# Patient Record
Sex: MALE | Race: OTHER | HISPANIC OR LATINO
[De-identification: names, ages, dates, MRNs, and addresses within clinical notes are randomized per-mention and may not be internally consistent; named-entity substitution may affect disease eponyms.]

---

## 2017-01-17 ENCOUNTER — APPOINTMENT (OUTPATIENT)
Dept: INTERNAL MEDICINE | Facility: CLINIC | Age: 32
End: 2017-01-17

## 2017-01-17 VITALS
HEIGHT: 64 IN | BODY MASS INDEX: 30.73 KG/M2 | WEIGHT: 180 LBS | SYSTOLIC BLOOD PRESSURE: 123 MMHG | HEART RATE: 70 BPM | DIASTOLIC BLOOD PRESSURE: 76 MMHG

## 2017-01-17 DIAGNOSIS — F17.200 NICOTINE DEPENDENCE, UNSPECIFIED, UNCOMPLICATED: ICD-10-CM

## 2017-01-17 DIAGNOSIS — E73.9 LACTOSE INTOLERANCE, UNSPECIFIED: ICD-10-CM

## 2017-04-18 ENCOUNTER — APPOINTMENT (OUTPATIENT)
Dept: INTERNAL MEDICINE | Facility: CLINIC | Age: 32
End: 2017-04-18

## 2017-04-18 VITALS
DIASTOLIC BLOOD PRESSURE: 70 MMHG | BODY MASS INDEX: 29.71 KG/M2 | HEIGHT: 64 IN | WEIGHT: 174 LBS | SYSTOLIC BLOOD PRESSURE: 115 MMHG | HEART RATE: 70 BPM

## 2017-04-18 DIAGNOSIS — Z31.69 ENCOUNTER FOR OTHER GENERAL COUNSELING AND ADVICE ON PROCREATION: ICD-10-CM

## 2017-04-18 DIAGNOSIS — Z87.898 PERSONAL HISTORY OF OTHER SPECIFIED CONDITIONS: ICD-10-CM

## 2017-04-18 LAB
ESTIMATED AVERGAGE GLUCOSE (NORTH): 108 MG/DL
HBA1C MFR BLD: 5.4 %

## 2017-04-20 ENCOUNTER — EMERGENCY (EMERGENCY)
Facility: HOSPITAL | Age: 32
LOS: 0 days | Discharge: HOME | End: 2017-04-20
Admitting: INTERNAL MEDICINE

## 2017-06-25 ENCOUNTER — EMERGENCY (EMERGENCY)
Facility: HOSPITAL | Age: 32
LOS: 1 days | Discharge: HOME | End: 2017-06-25

## 2017-06-25 DIAGNOSIS — T16.2XXA FOREIGN BODY IN LEFT EAR, INITIAL ENCOUNTER: ICD-10-CM

## 2017-06-27 DIAGNOSIS — S61.421D: ICD-10-CM
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
## 2017-07-18 ENCOUNTER — APPOINTMENT (OUTPATIENT)
Dept: INTERNAL MEDICINE | Facility: CLINIC | Age: 32
End: 2017-07-18

## 2018-10-24 ENCOUNTER — OUTPATIENT (OUTPATIENT)
Dept: OUTPATIENT SERVICES | Facility: HOSPITAL | Age: 33
LOS: 1 days | Discharge: HOME | End: 2018-10-24

## 2018-10-24 DIAGNOSIS — Z01.21 ENCOUNTER FOR DENTAL EXAMINATION AND CLEANING WITH ABNORMAL FINDINGS: ICD-10-CM

## 2018-11-03 ENCOUNTER — EMERGENCY (EMERGENCY)
Facility: HOSPITAL | Age: 33
LOS: 0 days | Discharge: HOME | End: 2018-11-03
Admitting: PHYSICIAN ASSISTANT

## 2018-11-03 VITALS
DIASTOLIC BLOOD PRESSURE: 77 MMHG | SYSTOLIC BLOOD PRESSURE: 138 MMHG | TEMPERATURE: 98 F | HEART RATE: 76 BPM | RESPIRATION RATE: 20 BRPM | OXYGEN SATURATION: 100 %

## 2018-11-03 DIAGNOSIS — H10.9 UNSPECIFIED CONJUNCTIVITIS: ICD-10-CM

## 2018-11-03 DIAGNOSIS — H57.9 UNSPECIFIED DISORDER OF EYE AND ADNEXA: ICD-10-CM

## 2018-11-03 RX ORDER — POLYMYXIN B SULF/TRIMETHOPRIM 10000-1/ML
1 DROPS OPHTHALMIC (EYE) ONCE
Qty: 0 | Refills: 0 | Status: COMPLETED | OUTPATIENT
Start: 2018-11-03 | End: 2018-11-03

## 2018-11-03 RX ADMIN — Medication 1 DROP(S): at 14:34

## 2018-11-03 NOTE — ED PROVIDER NOTE - NSFOLLOWUPCLINICS_GEN_ALL_ED_FT
[facility]  Ophthalmolgy  [address], [address]  [place] [address]  Phone: [phone]  Fax:   Follow Up Time:

## 2018-11-03 NOTE — ED PROVIDER NOTE - NSFOLLOWUPINSTRUCTIONS_ED_ALL_ED_FT
Conjunctivitis    Conjunctivitis is an inflammation of the clear membrane that covers the white part of your eye and the inner surface of your eyelid (conjunctiva). Symptoms include eye redness, eye pain, tearing and drainage, crusting of eyelids, swollen eyelids, and light sensitivity. Conjunctivitis may be contagious and easily spread from person to person. It can be caused by a virus, bacteria, or as part of an allergic reaction; the treatment depends on the type of conjunctivitis suspected. Avoid touching or rubbing your eyes and wipe away any drainage gently with a warm wet washcloth. Do not wear contact lenses until the inflammation is gone – wear glasses until your health care provider says it is safe to wear contact again. Do not share towels or washcloths that may spread the infection and wash your hands frequently.    SEEK IMMEDIATE MEDICAL CARE IF YOU HAVE ANY OF THE FOLLOWING SYMPTOMS: increasing pain, blurry vision, blindness, fever, or facial pain/redness/swelling.    Polytrim drops 1 drop right eye every 4 hours for 7 days

## 2018-11-03 NOTE — ED PROVIDER NOTE - OBJECTIVE STATEMENT
32 y/o male presents to the ED c/o "I have right eye redness and irritation for 2 days. I feel like something is in my eye." no visual changes/ hx trauma. Tetanus UTD

## 2018-11-06 ENCOUNTER — OUTPATIENT (OUTPATIENT)
Dept: OUTPATIENT SERVICES | Facility: HOSPITAL | Age: 33
LOS: 1 days | Discharge: HOME | End: 2018-11-06

## 2018-11-20 ENCOUNTER — OUTPATIENT (OUTPATIENT)
Dept: OUTPATIENT SERVICES | Facility: HOSPITAL | Age: 33
LOS: 1 days | Discharge: HOME | End: 2018-11-20

## 2019-01-13 ENCOUNTER — EMERGENCY (EMERGENCY)
Facility: HOSPITAL | Age: 34
LOS: 0 days | Discharge: HOME | End: 2019-01-13
Admitting: PHYSICIAN ASSISTANT

## 2019-01-13 VITALS
HEART RATE: 76 BPM | RESPIRATION RATE: 18 BRPM | SYSTOLIC BLOOD PRESSURE: 140 MMHG | DIASTOLIC BLOOD PRESSURE: 66 MMHG | TEMPERATURE: 99 F | OXYGEN SATURATION: 99 %

## 2019-01-13 DIAGNOSIS — M62.830 MUSCLE SPASM OF BACK: ICD-10-CM

## 2019-01-13 DIAGNOSIS — Z79.899 OTHER LONG TERM (CURRENT) DRUG THERAPY: ICD-10-CM

## 2019-01-13 DIAGNOSIS — M53.3 SACROCOCCYGEAL DISORDERS, NOT ELSEWHERE CLASSIFIED: ICD-10-CM

## 2019-01-13 RX ORDER — IBUPROFEN 200 MG
800 TABLET ORAL ONCE
Qty: 0 | Refills: 0 | Status: COMPLETED | OUTPATIENT
Start: 2019-01-13 | End: 2019-01-13

## 2019-01-13 RX ORDER — METHOCARBAMOL 500 MG/1
1 TABLET, FILM COATED ORAL
Qty: 21 | Refills: 0
Start: 2019-01-13 | End: 2021-02-20

## 2019-01-13 RX ORDER — METHOCARBAMOL 500 MG/1
1 TABLET, FILM COATED ORAL
Qty: 21 | Refills: 0
Start: 2019-01-13 | End: 2019-01-19

## 2019-01-13 RX ADMIN — Medication 800 MILLIGRAM(S): at 12:11

## 2019-01-13 NOTE — ED PROVIDER NOTE - NS ED ROS FT
Constitutional: (-) fever, (-) chills,  Eyes: (-) visual changes  Cardiovascular: (-) chest pain, (-) syncope  Respiratory: (-) cough, (-) shortness of breath, (-) dyspnea,   Gastrointestinal: (-) vomiting, (-) diarrhea, (-)nausea,   Musculoskeletal: (-) neck pain, (+) back pain, (-) joint pain,   Neurological: (-) headache, (-) saddle anesthesia (-) dizziness, (-) tingling, (-)numbness,   Peripheral Vascular: (-) pain while walking, (-) leg swelling, (-) color changes  : (-) incontinence (-)dysuria, (, (-) hematuria  Allergic/Immunologic: (-) pruritus

## 2019-01-13 NOTE — ED PROVIDER NOTE - PHYSICAL EXAMINATION
Physical Exam    Vital Signs: I have reviewed the initial vital signs.  Constitutional: well-nourished, appears stated age, no acute distress  Eyes: Conjunctiva pink, Sclera clear, PERRLA, EOMI.  Cardiovascular: S1 and S2, regular rate, regular rhythm, well-perfused extremities, radial pulses equal and 2+  Respiratory: unlabored respiratory effort, clear to auscultation bilaterally no wheezing, rales and rhonchi  Musculoskeletal: supple neck, no lower extremity edema, no midline tenderness, no c spien ttp, right paraspinal ttp in sacral region.  Integumentary: warm, dry, no rash  Neurologic: awake, alert, cranial nerves II-XII grossly intact, extremities’ motor and sensory functions grossly intact. Normal gait. no straight leg sign,

## 2019-01-13 NOTE — ED PROVIDER NOTE - OBJECTIVE STATEMENT
34 yo male, no pmh, presents to the ED for right sided lower back pain. Pt states started one week ago, is worse when laying down, has not used otc medication for pain, denies pain radiation, and describes as stiff. Pt denies ivda, saddle anesthesia, numbness, tingling, fever, chills, cp, sob, urinary incontinence, lower extremity pain or swelling, rash, ha, or visual changes.

## 2019-01-13 NOTE — ED PROVIDER NOTE - NSFOLLOWUPCLINICS_GEN_ALL_ED_FT
[facility]  Rehabilitation  [address] [place] [address]  Phone: [phone]  Fax:   Follow Up Time:

## 2019-01-13 NOTE — ED ADULT NURSE NOTE - NSIMPLEMENTINTERV_GEN_ALL_ED
Implemented All Universal Safety Interventions:  [place] to call system. Call bell, personal items and telephone within reach. Instruct patient to call for assistance. Room bathroom lighting operational. Non-slip footwear when patient is off stretcher. Physically safe environment: no spills, clutter or unnecessary equipment. Stretcher in lowest position, wheels locked, appropriate side rails in place.

## 2019-05-04 ENCOUNTER — EMERGENCY (EMERGENCY)
Facility: HOSPITAL | Age: 34
LOS: 0 days | Discharge: HOME | End: 2019-05-04
Admitting: EMERGENCY MEDICINE
Payer: SUBSIDIZED

## 2019-05-04 VITALS
DIASTOLIC BLOOD PRESSURE: 58 MMHG | HEART RATE: 78 BPM | TEMPERATURE: 98 F | OXYGEN SATURATION: 97 % | RESPIRATION RATE: 18 BRPM | SYSTOLIC BLOOD PRESSURE: 125 MMHG

## 2019-05-04 DIAGNOSIS — S00.212A ABRASION OF LEFT EYELID AND PERIOCULAR AREA, INITIAL ENCOUNTER: ICD-10-CM

## 2019-05-04 DIAGNOSIS — T15.90XA FOREIGN BODY ON EXTERNAL EYE, PART UNSPECIFIED, UNSPECIFIED EYE, INITIAL ENCOUNTER: ICD-10-CM
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
## 2019-05-04 DIAGNOSIS — Z79.1 LONG TERM (CURRENT) USE OF NON-STEROIDAL ANTI-INFLAMMATORIES (NSAID): ICD-10-CM
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
## 2019-05-04 PROCEDURE — 99283 EMERGENCY DEPT VISIT LOW MDM: CPT

## 2019-05-04 RX ORDER — POLYMYXIN B SULF/TRIMETHOPRIM 10000-1/ML
1 DROPS OPHTHALMIC (EYE) ONCE
Qty: 0 | Refills: 0 | Status: COMPLETED | OUTPATIENT
Start: 2019-05-04 | End: 2019-05-04

## 2019-05-04 RX ORDER — FLUORESCEIN SODIUM 9 MG
1 STRIP OPHTHALMIC (EYE) ONCE
Qty: 0 | Refills: 0 | Status: COMPLETED | OUTPATIENT
Start: 2019-05-04 | End: 2019-05-04

## 2019-05-04 RX ADMIN — Medication 1 DROP(S): at 16:35

## 2019-05-04 RX ADMIN — Medication 1 APPLICATION(S): at 16:35

## 2019-05-04 NOTE — ED PROVIDER NOTE - NS ED ROS FT
Constitutional: no fever, chills, no recent weight loss, change in appetite or malaise  Eyes: no discharge/pain/vision changes  ENT: no rhinorrhea/ear pain/sore throat  Cardiac: No chest pain, SOB or edema.  Respiratory: No cough or respiratory distress  GI: No nausea, vomiting, diarrhea or abdominal pain.  : No dysuria, frequency, urgency or hematuria  MS: no pain to back or extremities, no loss of ROM, no weakness  Neuro: No headache or weakness. No LOC.  Skin: No skin rash.  Endocrine: No history of thyroid disease or diabetes.  Except as documented in the HPI, all other systems are negative.

## 2019-05-04 NOTE — ED ADULT NURSE NOTE - CHPI ED NUR SYMPTOMS NEG
no pain/no vomiting/no weakness/no fever/no nausea/no chills/no dizziness/no tingling/no decreased eating/drinking

## 2019-05-04 NOTE — ED ADULT TRIAGE NOTE - CHIEF COMPLAINT QUOTE
patient stated that there is something in his left eye that was noticed today, denies blurry vision.

## 2019-05-04 NOTE — ED PROCEDURE NOTE - PROCEDURE ADDITIONAL DETAILS
pterygium b/l which pt sts have been present for years and saw ophtho for in the past  +corneal abrasion at 6 oclock, cannot r/o punctate fb although could not remove  advised f/u ophtho clinic and polytrim  tdap utd

## 2019-05-04 NOTE — ED PROVIDER NOTE - PROGRESS NOTE DETAILS
pterygium b/l which pt sts have been present for years and saw ophtho for in the past  +corneal abrasion at 6 oclock, cannot r/o punctate fb although could not remove with cotton tip applicator  advised f/u ophtho clinic and polytrim  tdap utd

## 2019-05-04 NOTE — ED ADULT NURSE NOTE - OBJECTIVE STATEMENT
The patient is a 33y Male complaining of foreign body in left eye, patient states something feels like something is in his eye and can feel it worse when he blinks. Patient denies any injury to eye, changes in vision, blurry vision, dizziness, chest pain or shortness of breath.

## 2019-05-04 NOTE — ED PROVIDER NOTE - PHYSICAL EXAMINATION
CONSTITUTIONAL: Well-appearing; well-nourished; in no apparent distress.   EYES: PERRL; EOM intact. left eye conjunctival injection, pterygium b/l, +corneal abrasion at 6 oclock, cannot r/o punctate fb   visual acuity 20/25 right eye and b/l, left eye 20/30  SKIN: Normal for age and race; warm; dry; good turgor; no apparent lesions or exudate.   NEURO/PSYCH: A & O x 4; grossly unremarkable. mood and manner are appropriate. Grooming and personal hygiene are appropriate. No apparent thoughts of harm to self or others.

## 2019-05-04 NOTE — ED PROVIDER NOTE - NSFOLLOWUPCLINICS_GEN_ALL_ED_FT
An Ophthalmologist  Ophthalmology  .  [place]   Phone:   Fax:   Follow Up Time:     [facility]  Ophthalmolgy  [address], [address]  [place] [address]  Phone: [phone]  Fax:   Follow Up Time:

## 2019-05-07 ENCOUNTER — OUTPATIENT (OUTPATIENT)
Dept: OUTPATIENT SERVICES | Facility: HOSPITAL | Age: 34
LOS: 1 days | Discharge: HOME | End: 2019-05-07

## 2019-08-31 ENCOUNTER — EMERGENCY (EMERGENCY)
Facility: HOSPITAL | Age: 34
LOS: 0 days | Discharge: HOME | End: 2019-08-31
Admitting: STUDENT IN AN ORGANIZED HEALTH CARE EDUCATION/TRAINING PROGRAM
Payer: SUBSIDIZED

## 2019-08-31 VITALS
OXYGEN SATURATION: 98 % | WEIGHT: 179.9 LBS | RESPIRATION RATE: 17 BRPM | SYSTOLIC BLOOD PRESSURE: 141 MMHG | TEMPERATURE: 99 F | HEIGHT: 64 IN | HEART RATE: 85 BPM | DIASTOLIC BLOOD PRESSURE: 86 MMHG

## 2019-08-31 DIAGNOSIS — N50.89 OTHER SPECIFIED DISORDERS OF THE MALE GENITAL ORGANS: ICD-10-CM

## 2019-08-31 DIAGNOSIS — R21 RASH AND OTHER NONSPECIFIC SKIN ERUPTION: ICD-10-CM

## 2019-08-31 PROCEDURE — 99283 EMERGENCY DEPT VISIT LOW MDM: CPT

## 2019-08-31 RX ORDER — NYSTATIN/TRIAMCINOLONE ACET
1 OINTMENT (GRAM) TOPICAL
Qty: 1 | Refills: 0
Start: 2019-08-31 | End: 2019-09-09

## 2019-08-31 NOTE — ED PROVIDER NOTE - PROGRESS NOTE DETAILS
D/W pt exact etiology of rash not confirmed, but d/w plan to tx and f/u with urology if no improvement.

## 2019-08-31 NOTE — ED PROVIDER NOTE - PATIENT PORTAL LINK FT
You can access the FollowMyHealth Patient Portal offered by [facility] by registering at the following website: [email or web address]. By joining [employer]’s FollowMyHealth portal, you will also be able to view your health information using other applications (apps) compatible with our system.

## 2019-08-31 NOTE — ED PROVIDER NOTE - PHYSICAL EXAMINATION
CONSTITUTIONAL: Well-appearing; well-nourished; in no apparent distress.   : uncircumcised, no penile d/c or lesions suggestive of std; see below  SKIN: +mild balanitis, otherwise normal for age and race; warm; dry; good turgor; no apparent lesions or exudate.   NEURO/PSYCH: A & O x 4; grossly unremarkable. mood and manner are appropriate. Grooming and personal hygiene are appropriate. No apparent thoughts of harm to self or others.

## 2019-08-31 NOTE — ED ADULT TRIAGE NOTE - CHIEF COMPLAINT QUOTE
"on my penis, on the tip it feels itchy, it started last night and there's some pimples" No pain, no discharge

## 2019-08-31 NOTE — ED PROVIDER NOTE - NSFOLLOWUPINSTRUCTIONS_ED_ALL_ED_FT
Rash    A rash is a change in the color of the skin. A rash can also change the way your skin feels. There are many different conditions and factors that can cause a rash, most of which are not dangerous. Make sure to follow up with your primary care physician or a dermatologist as instructed by your health care provider.    SEEK IMMEDIATE MEDICAL CARE IF YOU HAVE THE FOLLOWING SYMPTOMS: fever, blisters, a rash inside your mouth, or altered mental status.    Balanitis    WHAT YOU NEED TO KNOW:    Balanitis is inflammation of the glans (head) of the penis. It is usually caused by bacteria or a fungus.    DISCHARGE INSTRUCTIONS:    Medicines:     Medicines help fight or prevent an infection caused by bacteria or a fungus. This medicine may be given as a pill or a cream.      Take your medicine as directed. Contact your healthcare provider if you think your medicine is not helping or if you have side effects. Tell him of her if you are allergic to any medicine. Keep a list of the medicines, vitamins, and herbs you take. Include the amounts, and when and why you take them. Bring the list or the pill bottles to follow-up visits. Carry your medicine list with you in case of an emergency.    Clean your penis carefully: Gently push back the foreskin 2 to 3 times a day and wash the infected area well with soap and water. If you have a catheter, ask how to keep it clean.    Take a sitz bath: Fill a bathtub with 4 to 6 inches of warm water. You may also use a sitz bath pan that fits over a toilet. Sit in the sitz bath for 20 minutes. Do this 2 to 3 times a day, or as directed. The warm water can help decrease pain and swelling.     Maintain a healthy weight: Ask your healthcare provider how much you should weigh. Ask him to help you create a weight loss plan if you are overweight.     Follow up with your healthcare provider in 1 to 2 weeks: Write down your questions so you remember to ask them during your visits.    Contact your healthcare provider if:     You have a fever.      You have pain when you urinate.      You have questions or concerns about your condition or care.    Return to the emergency department if:     You are not able to urinate.              © Copyright [employer] 2019 All illustrations and images included in CareNotes are the copyrighted property of A.D.A.M., Inc. or [employer].

## 2019-08-31 NOTE — ED PROVIDER NOTE - OBJECTIVE STATEMENT
pt with no pmhx presents c/o itchy, nonpainful white bumps to tip of penis for 2 days   no h/o std or concern for std, intercourse always with condom usage  Denies fever/chill/HA/dizziness/chest pain/palpitation/sob/abd pain/n/v/d/ black stool/bloody stool/urinary sxs

## 2019-08-31 NOTE — ED PROVIDER NOTE - NSFOLLOWUPCLINICS_GEN_ALL_ED_FT
[facility]  Urology  .  [place]   Phone: [phone]  Fax:   Follow Up Time:

## 2019-09-19 ENCOUNTER — APPOINTMENT (OUTPATIENT)
Dept: UROLOGY | Facility: CLINIC | Age: 34
End: 2019-09-19

## 2019-11-29 ENCOUNTER — EMERGENCY (EMERGENCY)
Facility: HOSPITAL | Age: 34
LOS: 0 days | Discharge: HOME | End: 2019-11-29
Attending: EMERGENCY MEDICINE | Admitting: EMERGENCY MEDICINE
Payer: SUBSIDIZED

## 2019-11-29 VITALS
HEIGHT: 64 IN | DIASTOLIC BLOOD PRESSURE: 78 MMHG | SYSTOLIC BLOOD PRESSURE: 134 MMHG | HEART RATE: 106 BPM | WEIGHT: 164.24 LBS | TEMPERATURE: 101 F | OXYGEN SATURATION: 98 % | RESPIRATION RATE: 18 BRPM

## 2019-11-29 VITALS — TEMPERATURE: 99 F

## 2019-11-29 DIAGNOSIS — R50.9 FEVER, UNSPECIFIED: ICD-10-CM

## 2019-11-29 DIAGNOSIS — H57.89 OTHER SPECIFIED DISORDERS OF EYE AND ADNEXA: ICD-10-CM

## 2019-11-29 DIAGNOSIS — R05 COUGH: ICD-10-CM

## 2019-11-29 PROCEDURE — 71046 X-RAY EXAM CHEST 2 VIEWS: CPT | Mod: 26

## 2019-11-29 PROCEDURE — 99284 EMERGENCY DEPT VISIT MOD MDM: CPT

## 2019-11-29 RX ORDER — ACETAMINOPHEN 500 MG
975 TABLET ORAL ONCE
Refills: 0 | Status: COMPLETED | OUTPATIENT
Start: 2019-11-29 | End: 2019-11-29

## 2019-11-29 RX ORDER — KETOROLAC TROMETHAMINE 30 MG/ML
15 SYRINGE (ML) INJECTION ONCE
Refills: 0 | Status: DISCONTINUED | OUTPATIENT
Start: 2019-11-29 | End: 2019-11-29

## 2019-11-29 RX ORDER — IBUPROFEN 200 MG
1 TABLET ORAL
Qty: 12 | Refills: 0
Start: 2019-11-29 | End: 2019-12-01

## 2019-11-29 RX ORDER — SODIUM CHLORIDE 9 MG/ML
1000 INJECTION, SOLUTION INTRAVENOUS ONCE
Refills: 0 | Status: COMPLETED | OUTPATIENT
Start: 2019-11-29 | End: 2019-11-29

## 2019-11-29 RX ORDER — IBUPROFEN 200 MG
1 TABLET ORAL
Qty: 12 | Refills: 0
Start: 2019-11-29 | End: 2021-02-16

## 2019-11-29 RX ADMIN — Medication 15 MILLIGRAM(S): at 21:11

## 2019-11-29 RX ADMIN — SODIUM CHLORIDE 1000 MILLILITER(S): 9 INJECTION, SOLUTION INTRAVENOUS at 22:04

## 2019-11-29 RX ADMIN — Medication 15 MILLIGRAM(S): at 21:52

## 2019-11-29 RX ADMIN — Medication 975 MILLIGRAM(S): at 22:02

## 2019-11-29 RX ADMIN — SODIUM CHLORIDE 2000 MILLILITER(S): 9 INJECTION, SOLUTION INTRAVENOUS at 20:40

## 2019-11-29 NOTE — ED PROVIDER NOTE - ATTENDING CONTRIBUTION TO CARE
34y m no pmh p/w flu-like sx x 3d. Cough, eye irritation, myalgias, fever. Took 1/2 packet of therafly today around 6pm w/o relief. No ha, neck pain, sore throat, cp/sob, nvd, abd pain, flank pain, urinary sx, rash. PE: young m nad, ncat, +conjunctival injection bl, tms/nares clear, op clear pharynx nl, neck supple nt, tachy 100s reg rhythm nl s1s2 no mrg, ctab no wrr, abd soft ntnd no palpable masses no rgr, no cvat, ext no cce dpi, skin warm dry no rash.

## 2019-11-29 NOTE — ED PROVIDER NOTE - NS ED ROS FT
Constitutional:  See HPI.   Eyes:  No visual changes, eye pain or discharge.  ENMT:  No hearing changes, pain, discharge or infections. No neck pain or stiffness.  Cardiac:  No chest pain, SOB or edema. No chest pain with exertion.  Respiratory:  + cough without respiratory distress. No hemoptysis.  GI:  No nausea, vomiting, diarrhea, abdominal pain.  :  No dysuria, frequency, hematuria  MS:  No joint pain or back pain.  Neuro:  No LOC. No headache or weakness.    Skin:  No skin rash.  Except as in HPI, all other review of systems is negative

## 2019-11-29 NOTE — ED PROVIDER NOTE - CLINICAL SUMMARY MEDICAL DECISION MAKING FREE TEXT BOX
flu-like sx x 3d - defervesced/hr improved w/ivf & anti-pyretics - all results d/w pt & copies given, strict return precautions discussed, supportive care, Rx for motrin provided per wife's request, outpt PMD f/u

## 2019-11-29 NOTE — ED PROVIDER NOTE - PATIENT PORTAL LINK FT
You can access the FollowMyHealth Patient Portal offered by [facility] by registering at the following website: [email or web address]. By joining [employer]’s FollowMyHealth portal, you will also be able to view your health information using other applications (apps) compatible with our system.

## 2019-11-29 NOTE — ED ADULT NURSE NOTE - CHPI ED NUR SYMPTOMS NEG
no vomiting/no chills/no pain/no nausea/no weakness/no decreased eating/drinking/no dizziness/no tingling

## 2019-11-29 NOTE — ED PROVIDER NOTE - OBJECTIVE STATEMENT
34 y.o. M with no PMH with fever, and cough since yesterday night, + full body myalgias, + non productive cough no nausea/vomitting, no diarrhea, no recent travel, no sick contacts. Pt reported feeling better with theraflu.

## 2020-01-16 ENCOUNTER — OUTPATIENT (OUTPATIENT)
Dept: OUTPATIENT SERVICES | Facility: HOSPITAL | Age: 35
LOS: 1 days | Discharge: HOME | End: 2020-01-16

## 2020-01-16 ENCOUNTER — APPOINTMENT (OUTPATIENT)
Dept: UROLOGY | Facility: CLINIC | Age: 35
End: 2020-01-16
Payer: SUBSIDIZED

## 2020-01-16 VITALS
HEART RATE: 73 BPM | DIASTOLIC BLOOD PRESSURE: 85 MMHG | WEIGHT: 164 LBS | HEIGHT: 64 IN | SYSTOLIC BLOOD PRESSURE: 137 MMHG | BODY MASS INDEX: 28 KG/M2

## 2020-01-16 DIAGNOSIS — L29.3 ANOGENITAL PRURITUS, UNSPECIFIED: ICD-10-CM

## 2020-01-16 PROCEDURE — 99203 OFFICE O/P NEW LOW 30 MIN: CPT

## 2020-01-16 RX ORDER — NYSTATIN AND TRIAMCINOLONE ACETONIDE 100000; 1 MG/G; MG/G
100000-0.1 CREAM TOPICAL
Refills: 0 | Status: ACTIVE | COMMUNITY

## 2020-01-16 RX ORDER — NYSTATIN AND TRIAMCINOLONE ACETONIDE 100000; 1 MG/G; MG/G
100000-0.1 CREAM TOPICAL TWICE DAILY
Qty: 1 | Refills: 2 | Status: ACTIVE | COMMUNITY
Start: 2020-01-16 | End: 1900-01-01

## 2020-01-16 NOTE — HISTORY OF PRESENT ILLNESS
[FreeTextEntry1] : Patient is a 34 year old male, last seen by Dr. Santos in September 2016 for subfertility. He had slightly low volume at 1ml, otherwise hormone panel and semen analysis are normal. Issues with fertility likely reside with wife. No further  intervention was needed at the time. \par \par Labs from 6/2/2016\par Estriadiol 13 (26-61)\par FSH 3 (1.5-12)\par LH 4.3 (1.7/8.6)\par Prolactin 8 (4-15)\par \par Total Test 410 (250-1100)\par TSTFB Free 75.3 ()\par Sex Horm BG 20 (10-50)\par Albumin 5 (3.6-5.1)\par Bioavail Test 171 (110-0575)\par \par Semen Analysis from 9/9/2016\par viscolsity, normal\par fructose n/a\par ph 8.5\par volume 1ml\par count 106 million\par percent motile 86%\par WBC <1\par normal 9%\par \par Pt is here today for itching to tip of penis. says improves with nystatin cream but recurs monthly\par only one sexual partner in 6 years\par had hgb A1c of 5.7 three years ago

## 2020-01-16 NOTE — PHYSICAL EXAM
[General Appearance - Well Developed] : well developed [General Appearance - Well Nourished] : well nourished [Normal Appearance] : normal appearance [Well Groomed] : well groomed [General Appearance - In No Acute Distress] : no acute distress [Edema] : no peripheral edema [Respiration, Rhythm And Depth] : normal respiratory rhythm and effort [Exaggerated Use Of Accessory Muscles For Inspiration] : no accessory muscle use [Abdomen Soft] : soft [Abdomen Tenderness] : non-tender [Costovertebral Angle Tenderness] : no ~M costovertebral angle tenderness [Urethral Meatus] : meatus normal [Urinary Bladder Findings] : the bladder was normal on palpation [Scrotum] : the scrotum was normal [Skin Color & Pigmentation] : normal skin color and pigmentation [Normal Station and Gait] : the gait and station were normal for the patient's age [] : no rash [No Focal Deficits] : no focal deficits [Oriented To Time, Place, And Person] : oriented to person, place, and time [Affect] : the affect was normal [Not Anxious] : not anxious [Mood] : the mood was normal [No Palpable Adenopathy] : no palpable adenopathy [Inguinal Lymph Nodes Enlarged Bilaterally] : inguinal [Femoral Lymph Nodes Enlarged Bilaterally] : femoral

## 2020-01-22 DIAGNOSIS — L29.3 ANOGENITAL PRURITUS, UNSPECIFIED: ICD-10-CM

## 2020-01-26 LAB
ESTIMATED AVERAGE GLUCOSE: 235 MG/DL
HBA1C MFR BLD HPLC: 9.8 %

## 2020-06-18 ENCOUNTER — APPOINTMENT (OUTPATIENT)
Dept: UROLOGY | Facility: CLINIC | Age: 35
End: 2020-06-18

## 2021-02-14 ENCOUNTER — EMERGENCY (EMERGENCY)
Facility: HOSPITAL | Age: 36
LOS: 0 days | Discharge: HOME | End: 2021-02-14
Attending: STUDENT IN AN ORGANIZED HEALTH CARE EDUCATION/TRAINING PROGRAM | Admitting: STUDENT IN AN ORGANIZED HEALTH CARE EDUCATION/TRAINING PROGRAM
Payer: COMMERCIAL

## 2021-02-14 VITALS
DIASTOLIC BLOOD PRESSURE: 68 MMHG | WEIGHT: 149.91 LBS | SYSTOLIC BLOOD PRESSURE: 138 MMHG | TEMPERATURE: 99 F | OXYGEN SATURATION: 98 % | HEIGHT: 64 IN | RESPIRATION RATE: 16 BRPM | HEART RATE: 98 BPM

## 2021-02-14 DIAGNOSIS — Z20.822 CONTACT WITH AND (SUSPECTED) EXPOSURE TO COVID-19: ICD-10-CM

## 2021-02-14 DIAGNOSIS — M54.9 DORSALGIA, UNSPECIFIED: ICD-10-CM

## 2021-02-14 DIAGNOSIS — F17.200 NICOTINE DEPENDENCE, UNSPECIFIED, UNCOMPLICATED: ICD-10-CM

## 2021-02-14 DIAGNOSIS — M54.6 PAIN IN THORACIC SPINE: ICD-10-CM

## 2021-02-14 LAB — SARS-COV-2 RNA SPEC QL NAA+PROBE: SIGNIFICANT CHANGE UP

## 2021-02-14 PROCEDURE — 71045 X-RAY EXAM CHEST 1 VIEW: CPT | Mod: 26

## 2021-02-14 PROCEDURE — 99284 EMERGENCY DEPT VISIT MOD MDM: CPT

## 2021-02-14 RX ORDER — KETOROLAC TROMETHAMINE 30 MG/ML
30 SYRINGE (ML) INJECTION ONCE
Refills: 0 | Status: DISCONTINUED | OUTPATIENT
Start: 2021-02-14 | End: 2021-02-14

## 2021-02-14 RX ORDER — METHOCARBAMOL 500 MG/1
750 TABLET, FILM COATED ORAL ONCE
Refills: 0 | Status: COMPLETED | OUTPATIENT
Start: 2021-02-14 | End: 2021-02-14

## 2021-02-14 RX ADMIN — METHOCARBAMOL 750 MILLIGRAM(S): 500 TABLET, FILM COATED ORAL at 09:28

## 2021-02-14 RX ADMIN — Medication 30 MILLIGRAM(S): at 09:28

## 2021-02-14 NOTE — ED PROVIDER NOTE - CPE EDP RESP NORM
Dr irene at bedside communicating plan of care to pt and family, will await new orders.     Monika Gilliam, RN  10/18/18 [id number]     normal...

## 2021-02-14 NOTE — ED PROVIDER NOTE - PATIENT PORTAL LINK FT
You can access the FollowMyHealth Patient Portal offered by [facility] by registering at the following website: [email or web address]. By joining [employer]’s FollowMyHealth portal, you will also be able to view your health information using other applications (apps) compatible with our system.

## 2021-02-14 NOTE — ED PROVIDER NOTE - NSFOLLOWUPINSTRUCTIONS_ED_ALL_ED_FT
Musculoskeletal Pain    WHAT YOU NEED TO KNOW:    Muscle pain can be dull, achy, or sharp. You may have pain and tenderness to the touch as well. It can occur anywhere on your body and is often brought on by exercise. Muscle pain may occur from an injury, such as a sprain, tendonitis, or bone fracture. Muscle pain can also be the result of medical conditions, such as polymyositis, fibromyalgia, and connective tissue disorders.     DISCHARGE INSTRUCTIONS:    Self care:     Rest as directed and avoid activity that causes pain. You may be able to return to normal activity when you can move without pain. Follow directions for rest and activity. You are at risk for injury for 3 weeks after your symptoms go away.       Ice your painful muscle area to decrease pain and swelling. Use an ice pack, or put ice in a plastic bag and cover it with a towel. Always put a cloth between the ice and your skin. Apply the ice as often as directed for the first 24 to 48 hours.       Compression with a splint, brace, or elastic bandage helps decrease pain and swelling. This may be needed for muscle pain in arms or legs. A splint, brace, or bandage will also help protect the painful area when you move around.       Elevate a painful arm or leg to reduce swelling and pain. Elevate your limb while you are sitting or lying down. Prop a painful leg on pillows to keep it above the level of your heart.    Medicines:     NSAIDs help decrease swelling and pain or fever. This medicine is available with or without a doctor's order. NSAIDs can cause stomach bleeding or kidney problems in certain people. If you take blood thinner medicine, always ask your healthcare provider if NSAIDs are safe for you. Always read the medicine label and follow directions.      Acetaminophen is used to decrease pain. It is available without a doctor's order. Ask your healthcare provider how much to take and when to take it. Follow directions. Acetaminophen can cause liver damage if not taken correctly. Do not take more than one medicine that contains acetaminophen unless directed.       Muscle relaxers help relax your muscles to decrease pain and muscle spasms.       Steroids may be given to decrease redness, pain, and swelling.      Take your medicine as directed. Contact your healthcare provider if you think your medicine is not helping or if you have side effects. Tell him if you are allergic to any medicine. Keep a list of the medicines, vitamins, and herbs you take. Include the amounts, and when and why you take them. Bring the list or the pill bottles to follow-up visits. Carry your medicine list with you in case of an emergency.    Follow up with your healthcare provider as directed: You may need more tests to help healthcare providers find the cause of your muscle pain. You may need physical therapy to learn muscle strengthening exercises. Write down your questions so you remember to ask them during your visits.     Contact your healthcare provider if:     You have a fever.       You have trouble sleeping because of your pain.       Your painful area becomes more tender, red, and warm to the touch.       You have decreased movement of the painful area.       You have questions or concerns about your condition or care.    Return to the emergency department if:     You have increased severe pain when you move the painful muscle area.       You lose feeling in your painful muscle area.       You have new or worse swelling in the painful area. Your skin may feel tight.       You have increased muscle pain or pain that does not improve with treatment.         © Copyright [employer] 2019 All illustrations and images included in CareNotes are the copyrighted property of [employer] Inc[employer] or [employer].

## 2021-02-14 NOTE — ED PROVIDER NOTE - CLINICAL SUMMARY MEDICAL DECISION MAKING FREE TEXT BOX
35 year old male history of covid 1/25/21 presents here c/o mid back pain which started 3 days ago. Pressure in nature non radiating. No associated with fever chills cough sob or cp. Patient is a . No recent trauma or falls no numbness/tingling. VS reviewed. Xray WNL. Pain medication and muscle relaxer provided. Patient a spoken to in detail about results  All questions addressed.   Patient has proper follow up. Return precautions given.

## 2021-02-14 NOTE — ED ADULT NURSE NOTE - CHIEF COMPLAINT QUOTE
Pt. came to ED for c/o upper middle back pain for 3 days. Denies injury or trauma. Denies cp, sob, n/v/d, abd pain.

## 2021-02-14 NOTE — ED PROVIDER NOTE - ATTENDING CONTRIBUTION TO CARE
35 year old male history of covid 1/25/21 presents here c/o mid back pain which started 3 days ago. Pressure in nature non radiating. No associated with fever chills cough sob or cp. Patient is a . No recent trauma or falls no numbness/tingling.  On exam  CONSTITUTIONAL: WA / WN / NAD  HEAD: NCAT  EYES: PERRL; EOMI;   ENT: Normal pharynx; mucous membranes pink/moist, no erythema.  NECK: Supple; no meningeal signs  CARD: RRR; nl S1/S2; no M/R/G. Pulses equal bilaterally.  RESP: Respiratory rate and effort are normal; breath sounds clear and equal bilaterally.  MSK/EXT: No gross deformities; full range of motion. No rash on the back b/l ttp mid thoracic paraspinal L > R  SKIN: Warm and dry;   NEURO: AAOx3  PSYCH: Memory Intact, Normal Affect

## 2021-02-14 NOTE — ED PROVIDER NOTE - OBJECTIVE STATEMENT
36 y/o male COVID+ 1/25 presents to the ED c/o "I have upper back pain for 3-4 days. It is worse with movement." no cough/ sob/ hx trauma/ weakness

## 2021-02-14 NOTE — ED ADULT NURSE NOTE - PRO INTERPRETER NEED 2
Mari Rasmussen is a 24 year old female who was admitted to [facility] for Acute ITP and Acute Anemia. For Acute ITP she was transfused one platelets. She was given IVIG and prednisone as well. Eltrombopag was continued. For anemia she was transfused 2 units PRBC's.  CT abdomen was negative for retroperitoneal bleed, but has small focus of pelvis fluid collection. She denies a heavy  Menstrual cycle or hematochezia. Haptoglobin normal ruling out hemolysis. Hemoglobin following blood was 8.0. Patient requested to go home today. Case discussed with Dr. Calderon who stated that it was okay for patient to discharge. She will follow up with Hematology early next week. She will call for the appt. She will continue ferrous sulfate TID until seen by hematologist. Patient seen and examined on date of discharge and deemed suitable.   
English

## 2021-02-14 NOTE — ED ADULT NURSE NOTE - OBJECTIVE STATEMENT
Pt. came to ED for c/o upper middle back pain for 3 days. Denies injury or trauma. Denies cp, sob, n/v/d, abd pain. covid + 1/25

## 2021-02-14 NOTE — ED ADULT NURSE NOTE - HIV OFFER
Opt out Pt is well appearing, interactive with parents, walking, normal gait. will observe after PO intake. Pt is well appearing, interactive with parents, walking, normal gait. will observe after PO intake.  5:07pm- Pt remains well appearing, sitting upright legs crossed eating and drinking , no pain, no vomiting.  Pt is well appearing walking with normal gait, stable for discharge and follow up with medical doctor. Pt educated on care and need for follow up. Discussed anticipatory guidance and return precautions. Questions answered. I had a detailed discussion with the patient and/or guardian regarding the historical points, exam findings, and any diagnostic results supporting the discharge diagnosis.

## 2021-03-10 ENCOUNTER — APPOINTMENT (OUTPATIENT)
Dept: NEUROSURGERY | Facility: CLINIC | Age: 36
End: 2021-03-10

## 2021-09-04 ENCOUNTER — EMERGENCY (EMERGENCY)
Facility: HOSPITAL | Age: 36
LOS: 0 days | Discharge: HOME | End: 2021-09-04
Attending: EMERGENCY MEDICINE | Admitting: EMERGENCY MEDICINE
Payer: OTHER MISCELLANEOUS

## 2021-09-04 VITALS
RESPIRATION RATE: 18 BRPM | DIASTOLIC BLOOD PRESSURE: 76 MMHG | HEIGHT: 64 IN | WEIGHT: 169.98 LBS | HEART RATE: 77 BPM | SYSTOLIC BLOOD PRESSURE: 136 MMHG | TEMPERATURE: 98 F | OXYGEN SATURATION: 98 %

## 2021-09-04 DIAGNOSIS — S01.21XA LACERATION WITHOUT FOREIGN BODY OF NOSE, INITIAL ENCOUNTER: ICD-10-CM
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
## 2021-09-04 PROCEDURE — 99283 EMERGENCY DEPT VISIT LOW MDM: CPT | Mod: 25

## 2021-09-04 PROCEDURE — 12011 RPR F/E/E/N/L/M 2.5 CM/<: CPT

## 2021-09-04 NOTE — ED PROCEDURE NOTE - LENGTH OF LACERATION
Subjective:       Patient ID: Cecile Bowen is a 68 y.o. female.    Chief Complaint: No chief complaint on file.    HPI    Ms. Bowen returns today for follow up.  She has been on anastrazole for a month now, and has tolerated it well so far.   Briefly, she is a pleasant 68-year-old female with multiple comorbidities who on 08/01/2019, underwent a left   modified radical mastectomy with sentinel lymph node biopsy for a 1.2 cm grade 1 carcinoma that was ER strongly positive, MI positive and HER-2 negative.   Resection margins were clear and white.  Unfortunately, one of two sentinel lymph nodes had micrometastases.    Of note, a MammaPrint was low score predicting for 97.8% probability of disease free survival at five years with hormonal management.  She was deemed a poor candidate for chemotherapy and she was started on anastrazole in mid September 2019.    Her DXA scan from today was reviewed; she does have osteopenia of the L spine and the hip.    Review of Systems    Overall she feels well.  She has fully recovered from her surgery, and has no pain.  She states that the left chest wall feels numb since her surgery.   She mentions her occasional migraines, and also fatigue and SOB with exertion.  She is using a scooter for ambulation due to her DJD.   She denies any anxiety, depression, easy bruising, fevers, chills, night  sweats, weight loss, nausea, vomiting, diarrhea, constipation, diplopia, blurred vision, chest pain, palpitations, breast pain, abdominal pain, extremity pain, or difficulty ambulating.  The remainder of the ten-point ROS, including general, skin, lymph, H/N, cardiorespiratory, GI, , Neuro, Endocrine, and psychiatric is negative.     Objective:      Physical Exam      She is alert, oriented to time, place, person, pleasant, well      nourished, in no acute physical distress.   She was examined on her scooter as she is not able to climb on the examination table.                                   VITAL SIGNS:  Reviewed                                      HEENT:  Normal.  There are no nasal, oral, lip, gingival, auricular, lid,    or conjunctival lesions.  Mucosae are moist and pink, and there is no        thrush.  Pupils are equal, reactive to light and accommodation.              Extraocular muscle movements are intact.  She is edentulous.  There is no frontal or maxillary tenderness.                                     NECK:  Supple without JVD, adenopathy, or thyromegaly.                       LUNGS:  Clear to auscultation without wheezing, rales, or rhonchi.           CARDIOVASCULAR:  Reveals an S1, S2, no murmurs, no rubs, no gallops.         ABDOMEN:  Soft, morbidly obese, nontender, without organomegaly.  Bowel sounds are    present. She does have a permanent suprapubic cystostomy.                                                                    EXTREMITIES:  No cyanosis, clubbing, or edema.                               BREASTS:  She is status post left mastectomy with a well-healed incision and no evidence of a chest wall recurrence.  There are no masses in the right breast.                                 LYMPHATIC:  There is no cervical, axillary, or supraclavicular adenopathy.   SKIN:  Warm and moist, without petechiae, rashes, induration, or ecchymoses.           NEUROLOGIC:  DTRs are 0-1+ bilaterally, symmetrical, motor function is 5/5,  and cranial nerves are  within normal limits.    Assessment:       1. 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 Morbid obesity due to excess calories      2.    DM, Morbid obesity, extensive DJD, s/p suprabubic cystostomy.  4.      Osteopenia  Plan:         I had a long discussion with her;   She will remain on anastrazole through September 2024.  I will see her again in 3 months.  She is on vitamin D 50,000 units weekly; she was instructed to take 1,000 mg of calcium daily as well.  Her multiple  1.5 questions were answered to her satisfaction.

## 2021-09-04 NOTE — ED PROVIDER NOTE - NSFOLLOWUPINSTRUCTIONS_ED_ALL_ED_FT
You received 3 sutures in your nose. Return to the ED to have your sutures removed in 5 days.    Laceration    A laceration is a cut that goes through all of the layers of the skin and into the tissue that is right under the skin. Some lacerations heal on their own. Others need to be closed with skin adhesive strips, skin glue, stitches (sutures), or staples. Proper laceration care minimizes the risk of infection and helps the laceration to heal better.  If non-absorbable stitches or staples have been placed, they must be taken out within the time frame instructed by your healthcare provider.    SEEK IMMEDIATE MEDICAL CARE IF YOU HAVE ANY OF THE FOLLOWING SYMPTOMS: swelling around the wound, worsening pain, drainage from the wound, red streaking going away from your wound, inability to move finger or toe near the laceration, or discoloration of skin near the laceration.

## 2021-09-04 NOTE — ED PROVIDER NOTE - PATIENT PORTAL LINK FT
You can access the FollowMyHealth Patient Portal offered by [facility] by registering at the following website: [email or web address]. By joining [employer]’s FollowMyHealth portal, you will also be able to view your health information using other applications (apps) compatible with our system.

## 2021-09-04 NOTE — ED PROVIDER NOTE - ATTENDING CONTRIBUTION TO CARE
36M no pmh p/w laceration to bridge of nose. states was doing work in attic and a wood beam fell on his nose and sustained lac. no chi or loc. no numbness, weakness. c/o sharp constant pain. tdap UTD. no other injuries sustained.    on exam, AFVSS, well mary nad, nc, lac to bridge of nose,, eomi, perrla, mmm, lctab, rrr nl s1s2 no mrg, abd soft ntnd, aaox3, no focal deficits, no le edema or calf ttp,     a/p; Laceration to nose, tdap utd, wound cleaned, lac repaired, wound care instructions provided, f/u 5 days for suture removal, nv intact, strict return precautions provided.

## 2021-09-04 NOTE — ED PROVIDER NOTE - CLINICAL SUMMARY MEDICAL DECISION MAKING FREE TEXT BOX
a/p; Laceration to nose, tdap utd, wound cleaned, lac repaired, wound care instructions provided, f/u 5 days for suture removal, nv intact, strict return precautions provided.

## 2021-09-04 NOTE — ED PROVIDER NOTE - OBJECTIVE STATEMENT
36M presents for laceration on nose after a wooden beam fell on his face in the attic. No LOC, no AC/AP. No fall. No chest pain/sob, abd pain, n/v/d. Bleeding controlled pta.

## 2021-09-04 NOTE — ED PROVIDER NOTE - PHYSICAL EXAMINATION
VITAL SIGNS: I have reviewed nursing notes and confirm.  CONSTITUTIONAL: well-appearing, non-toxic, NAD  SKIN: Warm dry, normal skin turgor  HEAD: +1.5cm hemicircular lac of nose with no active bleeding  EYES: EOMI, no scleral icterus  ENT: Moist mucous membranes, normal pharynx with no erythema or exudates  NECK: Supple; non tender. Full ROM. No cervical LAD  CARD: RRR, no murmurs, rubs or gallops  RESP: clear to ausculation b/l.  No rales, rhonchi, or wheezing.  ABD: soft, non-tender, non-distended, no rebound or guarding.   EXT: Full ROM, no bony tenderness, no pedal edema, no calf tenderness  NEURO: Normal gait.  PSYCH: Cooperative, appropriate.

## 2021-09-04 NOTE — ED ADULT NURSE NOTE - FINAL NURSING ELECTRONIC SIGNATURE
89F.  admitted 04/27/21.  presented to ED c/o CP.  sudden onset.      PROBLEMS;    Hypoxaemia  Saddle PE w/ R heart strain  NSTEMI.     PLAN:    pulmonary stable  Eliquis 10mg bid  oob to chair  SUPPORTIVE CARE  D/W staff 04-Sep-2021 16:48

## 2022-04-21 ENCOUNTER — OUTPATIENT (OUTPATIENT)
Dept: OUTPATIENT SERVICES | Facility: HOSPITAL | Age: 37
LOS: 1 days | Discharge: HOME | End: 2022-04-21

## 2022-04-21 DIAGNOSIS — Z20.828 CONTACT WITH AND (SUSPECTED) EXPOSURE TO OTHER VIRAL COMMUNICABLE DISEASES: ICD-10-CM

## 2022-04-22 LAB — SARS-COV-2 RNA SPEC QL NAA+PROBE: SIGNIFICANT CHANGE UP

## 2022-05-03 ENCOUNTER — OUTPATIENT (OUTPATIENT)
Dept: OUTPATIENT SERVICES | Facility: HOSPITAL | Age: 37
LOS: 1 days | Discharge: HOME | End: 2022-05-03

## 2022-05-03 DIAGNOSIS — Z20.828 CONTACT WITH AND (SUSPECTED) EXPOSURE TO OTHER VIRAL COMMUNICABLE DISEASES: ICD-10-CM

## 2022-05-03 LAB — SARS-COV-2 RNA SPEC QL NAA+PROBE: SIGNIFICANT CHANGE UP

## 2022-05-20 NOTE — ED ADULT NURSE NOTE - CAS DISCH TRANSFER METHOD
Goal Outcome Evaluation:      DATE & TIME: 5/20/22 ([id number])    Cognitive Concerns/ Orientation : A&Ox 4   BEHAVIOR & AGGRESSION TOOL COLOR: Green  CIWA SCORE: NA   ABNL VS/O2: VSS, done daily  MOBILITY: Ind, enjoys walks in the [place] with and without staff  PAIN MANAGMENT: Denies  DIET: Regular  BOWEL/BLADDER: Continent, No BM   ABNL LAB/BG: None new  DRAIN/DEVICES: None  TELEMETRY RHYTHM: NA  SKIN: Intact, flaky scalp.  1+ edema on ankles  TESTS/PROCEDURES: None  D/C DATE: Discharge pending MA paperwork and group home placement, SW following.                            Private car

## 2022-09-13 NOTE — ED PROVIDER NOTE - NS ED ROS FT
[de-identified] : The patient appears well nourished  and in no apparent distress.  The patient is alert and oriented to person, place, and time.   Affect and mood appear normal.    The head is normocephalic and atraumatic.  The eyes reveal normal sclera and extra ocular muscles are intact.   The neck appears normal with no jugular venous distention or masses noted.   Skin shows normal turgor with no evidence of eczema or psoriasis.  No respiratory distress noted.  The patient ambulates with antalgic gait.\par \par The right and left knees have decreased range of motion 0 to 120 degrees.  There is pain with terminal flexion and extension.  Crepitations are noted.  Tenderness about the medial and patellofemoral joints is noted..   There is a negative [facility] sign.  There is no soft tissue swelling, warmth, or erythema.   There is a negative Lachman sign.  There is no instability to varus/valgus stress.  There is no instability to anterior/posterior drawer.  There is normal strength  in the quadriceps and hamstring muscles.  Strength and sensation are intact distally.  There are normal pulses distally and good capillary refill.  No edema or lymphadenopathy noted.  \par  Constitutional: (-) diaphoresis  Eyes/ENT: (-) runny nose  Cardiovascular: (-) chest pain  Respiratory: (-) cough  Gastrointestinal: (-) vomiting, (-) diarrhea  : (-) dysuria   Musculoskeletal: (-) joint pain  Integumentary: see HPI  Neurological: (-) LOC  Allergic/Immunologic: (-) pruritus

## 2023-07-12 ENCOUNTER — APPOINTMENT (OUTPATIENT)
Dept: NEUROLOGY | Facility: CLINIC | Age: 38
End: 2023-07-12

## 2023-07-16 ENCOUNTER — EMERGENCY (EMERGENCY)
Facility: HOSPITAL | Age: 38
LOS: 0 days | Discharge: HOME | End: 2023-07-16
Attending: STUDENT IN AN ORGANIZED HEALTH CARE EDUCATION/TRAINING PROGRAM
Payer: MEDICAID

## 2023-07-16 VITALS
SYSTOLIC BLOOD PRESSURE: 131 MMHG | RESPIRATION RATE: 16 BRPM | DIASTOLIC BLOOD PRESSURE: 70 MMHG | HEART RATE: 72 BPM | OXYGEN SATURATION: 99 % | TEMPERATURE: 98 F

## 2023-07-16 DIAGNOSIS — M26.601 RIGHT TEMPOROMANDIBULAR JOINT DISORDER, UNSPECIFIED: ICD-10-CM

## 2023-07-16 DIAGNOSIS — R68.84 JAW PAIN: ICD-10-CM

## 2023-07-16 DIAGNOSIS — E11.9 TYPE 2 DIABETES MELLITUS WITHOUT COMPLICATIONS: ICD-10-CM

## 2023-07-16 PROCEDURE — 99283 EMERGENCY DEPT VISIT LOW MDM: CPT

## 2023-07-16 RX ORDER — IBUPROFEN 200 MG
800 TABLET ORAL ONCE
Refills: 0 | Status: COMPLETED | OUTPATIENT
Start: 2023-07-16 | End: 2023-07-16

## 2023-07-16 RX ADMIN — Medication 800 MILLIGRAM(S): at 21:55

## 2023-07-16 NOTE — ED PROVIDER NOTE - OBJECTIVE STATEMENT
37-year-old male w PMHx of diabetes presenting here complaining of 2 months of right jaw pain. Pain is described as mild, intermittent, hears a clicking, last for few seconds. Denies fever, chills, CP, SOB, drooling, throat pain, difficulty swallowing, n/v.

## 2023-07-16 NOTE — ED PROVIDER NOTE - NSFOLLOWUPINSTRUCTIONS_ED_ALL_ED_FT
Please follow up with your PMD 1-3 days  Please follow up with dentist 1-3 days    Temporomandibular Joint Syndrome  Side view of a human skull showing the temporomandibular joint.   Temporomandibular joint syndrome (TMJ syndrome) is a condition that causes pain in the temporomandibular joints. These joints are located near your ears and allow your jaw to open and close. For people with TMJ syndrome, chewing, biting, or other movements of the jaw can be difficult or painful.    TMJ syndrome is often mild and goes away within a few weeks. However, sometimes the condition becomes a long-term (chronic) problem.    What are the causes?  This condition may be caused by:  Grinding your teeth or clenching your jaw. Some people do this when they are stressed.  Arthritis.  An injury to the jaw.  A head or neck injury.  Teeth or dentures that are not aligned well.  In some cases, the cause of TMJ syndrome may not be known.    What are the signs or symptoms?  The most common symptom of this condition is aching pain on the side of the head in the area of the TMJ. Other symptoms may include:  Pain when moving your jaw, such as when chewing or biting.  Not being able to open your jaw all the way.  Making a clicking sound when you open your mouth.  Headache.  Earache.  Neck or shoulder pain.  How is this diagnosed?  This condition may be diagnosed based on:  Your symptoms and medical history.  A physical exam. Your health care provider may check the range of motion of your jaw.  Imaging tests, such as X-rays or an MRI.  You may also need to see your dentist, who will check if your teeth and jaw are lined up correctly.    How is this treated?  TMJ syndrome often goes away on its own. If treatment is needed, it may include:  Eating soft foods and applying ice or heat.  Medicines to relieve pain or inflammation.  Medicines or massage to relax the muscles.  A splint, bite plate, or mouthpiece to prevent teeth grinding or jaw clenching.  Relaxation techniques or counseling to help reduce stress.  A therapy for pain in which an electrical current is applied to the nerves through the skin (transcutaneous electrical nerve stimulation).  Acupuncture. This may help to relieve pain.  Jaw surgery. This is rarely needed.  Follow these instructions at home:  Bag of ice on a towel on the skin.   Eating and drinking    Eat a soft diet if you are having trouble chewing.  Avoid foods that require a lot of chewing. Do not chew gum.  General instructions    Take over-the-counter and prescription medicines only as told by your health care provider.  If directed, put ice on the painful area. To do this:  Put ice in a plastic bag.  Place a towel between your skin and the bag.  Leave the ice on for 20 minutes, 2–3 times a day.  Remove the ice if your skin turns bright red. This is very important. If you cannot feel pain, heat, or cold, you have a greater risk of damage to the area.  Apply a warm, wet cloth (warm compress) to the painful area as told.  Massage your jaw area and do any jaw stretching exercises as told by your health care provider.  If you were given a splint, bite plate, or mouthpiece, wear it as told by your health care provider.  Keep all follow-up visits. This is important.  Where to find more information  [employer]: www.nidcr.nih.gov  Contact a health care provider if:  You have trouble eating.  You have new or worsening symptoms.  Get help right away if:  Your jaw locks.  Summary  Temporomandibular joint syndrome (TMJ syndrome) is a condition that causes pain in the temporomandibular joints. These joints are located near your ears and allow your jaw to open and close.  TMJ syndrome is often mild and goes away within a few weeks. However, sometimes the condition becomes a long-term (chronic) problem.  Symptoms include an aching pain on the side of the head in the area of the TMJ, pain when chewing or biting, and being unable to open your jaw all the way. You may also make a clicking sound when you open your mouth.  TMJ syndrome often goes away on its own. If treatment is needed, it may include medicines to relieve pain, reduce inflammation, or relax the muscles. A splint, bite plate, or mouthpiece may also be used to prevent teeth grinding or jaw clenching.  This information is not intended to replace advice given to you by your health care provider. Make sure you discuss any questions you have with your health care provider.

## 2023-07-16 NOTE — ED PROVIDER NOTE - CLINICAL SUMMARY MEDICAL DECISION MAKING FREE TEXT BOX
37-year-old male history of diabetes presenting here complaining of 2 months of right jaw pain intermittent hears a clicking not associated with trouble breathing last few seconds describes pain as mild no trouble swallowing vs reviewed meds given. Patient spoken to in detail , all questions answered, has proper follow up recommended pmd and dental follow up. return precautions provided

## 2023-07-16 NOTE — ED PROVIDER NOTE - NSFOLLOWUPCLINICS_GEN_ALL_ED_FT
[facility]  Dental  [address] [place] [id number]  Phone: [phone]  Fax:   Follow Up Time: 1-3 Days

## 2023-07-16 NOTE — ED PROVIDER NOTE - PHYSICAL EXAMINATION
VITAL SIGNS: I have reviewed nursing notes and confirm.  CONSTITUTIONAL: non-toxic, well appearing  SKIN: no rash, no petechiae.  EYES: PERRL,  pink conjunctiva, anicteric  ENT: tongue midline, no exudates, MMM  NECK: Supple; no meningismus, no JVD  CARD: RRR, no murmurs, equal radial pulses bilaterally 2+  RESP: CTAB, no respiratory distress  MSK: +ttp right TMJ joint with palpable clicking, no skin changes  NEURO: Alert, oriented. CN2-12 intact, equal strength bilaterally  PSYCH: Cooperative, appropriate.

## 2023-07-16 NOTE — ED PROVIDER NOTE - ATTENDING CONTRIBUTION TO CARE
37-year-old male history of diabetes presenting here complaining of 2 months of right jaw pain intermittent hears a clicking not associated with trouble breathing last few seconds describes pain as mild no trouble swallowing   CONSTITUTIONAL: WA / WN / NAD  HEAD: NCAT +ttp right TMJ joint with palpable clicking   EYES: PERRL; EOMI;   ENT: Normal pharynx; mucous membranes pink/moist, no erythema. airway patent   NECK: Supple;  SKIN: Warm and dry;   NEURO: AAOx3  PSYCH: Memory Intact, Normal Affect

## 2023-07-16 NOTE — ED PROVIDER NOTE - PATIENT PORTAL LINK FT
You can access the FollowMyHealth Patient Portal offered by [facility] by registering at the following website: [email or web address]. By joining [employer]’s FollowMyHealth portal, you will also be able to view your health information using other applications (apps) compatible with our system.

## 2023-08-16 ENCOUNTER — APPOINTMENT (OUTPATIENT)
Dept: NEUROLOGY | Facility: CLINIC | Age: 38
End: 2023-08-16
Payer: MEDICAID

## 2023-08-16 VITALS
SYSTOLIC BLOOD PRESSURE: 133 MMHG | DIASTOLIC BLOOD PRESSURE: 84 MMHG | HEART RATE: 84 BPM | WEIGHT: 145 LBS | BODY MASS INDEX: 24.75 KG/M2 | HEIGHT: 64 IN

## 2023-08-16 DIAGNOSIS — M26.621 ARTHRALGIA OF RIGHT TEMPOROMANDIBULAR JOINT: ICD-10-CM

## 2023-08-16 DIAGNOSIS — S03.00XA DISLOCATION OF JAW, UNSPECIFIED SIDE, INITIAL ENCOUNTER: ICD-10-CM

## 2023-08-16 PROCEDURE — 99204 OFFICE O/P NEW MOD 45 MIN: CPT

## 2023-08-19 NOTE — ASSESSMENT
[FreeTextEntry1] : Impression is that of dislocation of the right TMJ disc. An MRI will be obtained to document this. He should then follow up with oral surgery.   Total clinician time spent today on the patient is 30 minutes including preparing to see the patient, obtaining and/or reviewing and confirming history, performing medically necessary and appropriate examination, counseling and educating the patient and/or family, documenting clinical information in the EHR and communicating and/or referring to other healthcare professionals.  Entered by Altagracia Peña acting as scribe for Dr. Horta.   The documentation recorded by the scribe, in my presence, accurately reflects the service I personally performed, and the decisions made by me with my edits as appropriate.  Dipesh Horta MD, FAAN, FACP Diplomate American Board of Psychiatry & Neurology

## 2023-08-19 NOTE — HISTORY OF PRESENT ILLNESS
[FreeTextEntry1] : Patient is a 37-year-old male who presents today in neurologic consultation for pain in the right TMJ joint beginning in May. This began without precipitating injury, accident, or trauma to his face or jaw. Patient experiences this pain when he opens his mouth wide or bites down to chew. He has no difficulty speaking. He is able to eat. The pain radiates up the entire right side of the head and down the right mandibular region. He went to the ED and the questions that he asked clearly established that the problem was in the TMJ, but he had no x-rays, CT scan, or MRI taken of the TMJ.

## 2023-08-19 NOTE — PHYSICAL EXAM
[FreeTextEntry1] : PHYSICAL EXAMINATION: Head: Normocephalic, atraumatic. Negative TA tenderness/prominence.   Neck: Supple with full range of motion; nontender with negative bilateral Spurling's signs.   Spine: Full range of motion; nontender. Negative straight leg raise maneuvers.   Extremities: Non-tender. Atraumatic. Negative Tinel's signs.   NEUROLOGICAL EXAMINATION:   Mental Status: With a mild language barrier - Patient is a good informant with intact orientation, attention, concentration, recent and remote memory. Language evaluation reveals no evidence of aphasia. Fund of knowledge is normal.   Cranial Nerves Cranial Nerves:   II, III, IV, VI: Pupils are equal, round, and reactive to light and accommodation. No evidence of afferent pupillary defect. Visual fields are full to confrontation. Eye movements are full without evidence of nystagmus or internuclear ophthalmoplegia. Funduscopic examination reveals sharp disc margins.   V: When the patient opens his jaw wide, it is very painful in the right TMJ. When he bites down, he also experiences pain in the right TMJ. There is crepitus on the right when he moves his jaw side to side.  VII: Normal facial motor testing.   VIII: Grossly normal hearing bilaterally.   IX, X: Palate moves symmetrically. No dysarthria.   XI: Normal shoulder shrug and sternocleidomastoid power.   XII: Tongue appears normal and protrudes in the midline.   Motor: Normal bulk, tone, and power throughout.   Muscle Stretch Reflexes (right/left): 2+ symmetrical.   Plantar Responses: Flexor bilaterally.   Coordination: Normal finger to nose and heel to shin testing, no truncal ataxia and no tremor.   Sensation: Normal primary sensation. Normal double simultaneous stimulation.   Gait and Station: Normal base, stride, and turning. Normal toe and heel walking. Normal tandem. Negative Romberg.

## 2023-09-13 ENCOUNTER — APPOINTMENT (OUTPATIENT)
Dept: NEUROLOGY | Facility: CLINIC | Age: 38
End: 2023-09-13
Payer: MEDICAID

## 2023-09-13 VITALS — BODY MASS INDEX: 24.16 KG/M2 | HEIGHT: 65 IN | WEIGHT: 145 LBS

## 2023-09-13 PROCEDURE — 99214 OFFICE O/P EST MOD 30 MIN: CPT

## 2024-03-15 NOTE — ED PROVIDER NOTE - NS HIV RISK FACTOR YES
Chief Complaint   Patient presents with    Sciatica     Left sciatic pain, pt states she has been dealing with this for 3 months. Seeing a spine surgeon in April.       HISTORY OF PRESENT ILLNESS:  Vita Wills is a 73 year old with a history of sciatica, hypertension and multiple sclerosis who presents with left lower back pain that has been ongoing since early January of 2024.  No history of injury.  Patient states that she has been dealing with this pain daily.  She states that she will experience sharp pain that goes from her left buttock down her left lateral leg down to her foot.  Patient states at times her left foot we will feel numb and tingling.  Patient was seen at walk-in clinic and treated for left sciatic pain with steroids, tizanidine and Norco.  Patient states that she felt great while on treatment.  Patient states shortly after treatment pain returned.  Patient was even doing 8 sessions of physical therapy which helped still never relieved the pain.  Sitting, twisting, walking, and sometimes bending makes it worse.  Currently not much makes symptoms improved. 7/10 pain. Taking left over Norco and sometimes Tylenol. No meds taken today.   Patient has an appointment to see a spine surgeon in April of 2024.  Patient denies any weakness, abdominal pain, shortness of breath, chest pain, dizziness, fever, rash, dysuria, nausea, vomiting, diarrhea, urgency, frequency, constipation, recent URI, or recent travel.    PAST MEDICAL HISTORY, PAST SURGICAL HISTORY, SOCIAL HISTORY, MEDICATIONS, AND ALLERGIES:  Reviewed with patient.    Past Medical History:   Diagnosis Date    Essential (primary) hypertension     Multiple sclerosis (CMD)      Past Surgical History:   Procedure Laterality Date    Joint replacement      bilateral knees     Social History     Tobacco Use    Smoking status: Never    Smokeless tobacco: Never   Substance Use Topics    Alcohol use: Yes     Comment: socially    Drug use: No     Current  Outpatient Medications   Medication Sig Dispense Refill    methylPREDNISolone (MEDROL DOSEPAK) 4 MG tablet follow package directions 1 packet 0    cyclobenzaprine (FLEXERIL) 10 MG tablet Take 1 tablet by mouth nightly as needed for Muscle spasms (Sedation will occur.). 15 tablet 0    methylPREDNISolone (MEDROL DOSEPAK) 4 MG tablet Take 1 tablet by mouth as directed. follow package directions (Patient not taking: Reported on 3/15/2024) 21 tablet 0    tiZANidine (ZANAFLEX) 2 MG tablet Take 1 tablet by mouth every 8 hours as needed for Muscle spasms. (Patient not taking: Reported on 3/15/2024) 15 tablet 0    HYDROcodone-acetaminophen (NORCO) 5-325 MG per tablet Take 1 tablet by mouth every 6 hours as needed for Pain. 10 tablet 0    Misc Natural Products (GLUCOSAMINE CHOND CMP ADVANCED PO)       triamcinolone (KENALOG) 0.025 % cream Apply topically 2 times daily. 30 g 1    erythromycin (ILOTYCIN) ophthalmic ointment Place into right eye nightly. 3.5 g 0    triamterene-hydroCHLOROthiazide (MAXZIDE-25) 37.5-25 MG per tablet Take 1 tablet by mouth daily.      meloxicam (MOBIC) 15 MG tablet Take 15 mg by mouth daily.      losartan (COZAAR) 50 MG tablet Take 50 mg by mouth daily.      pramipexole (MIRAPEX) 0.25 MG tablet Take 0.25 mg by mouth daily.      MULTIPLE VITAMIN PO Take  by mouth.      Calcium 600 MG tablet Take 1 tablet by mouth daily.      Omega-3 Fatty Acids (FISH OIL) 1200 MG capsule Take 1,200 mg by mouth daily.       ferrous sulfate 325 (65 FE) MG tablet Take 325 mg by mouth daily (with breakfast).      vitamin E 1000 UNITS capsule Take 1,000 Units by mouth daily.        FLUoxetine (PROZAC) 20 MG capsule Take 20 mg by mouth daily.        gabapentin (NEURONTIN) 100 MG capsule Take 100 mg by mouth nightly.        simvastatin (ZOCOR) 40 MG tablet Take 40 mg by mouth nightly.        traZODONE (DESYREL) 50 MG tablet Take 50 mg by mouth nightly.        Cholecalciferol (VITAMIN D3) 5000 UNIT TABS Take 1 tablet by  mouth daily.         No current facility-administered medications for this visit.     ALLERGIES:   Allergen Reactions    Penicillins Other (See Comments)     The reaction occurred as a child.      History reviewed. No pertinent family history.    REVIEW OF SYSTEMS:  Constitutional: Denies fever. Denies body aches. Denies chills.  Skin: Denies rash.  Denies bruising. Denies abscess/swelling.   HEENT: Denies visual acuity changes. Denies eye redness. Denies nasal congestion. Denies ear pain. Denies sore throat.   Respiratory: Denies cough. Denies wheezing. Denies shortness of breath.   Cardiovascular: Denies chest pain. Denies palpitations.   Gastrointestinal: Denies abdominal pain. Denies nausea. Denies vomiting. Denies bloody stools. Denies constipation. Denies diarrhea.   Genitourinary: Denies dysuria. Denies urgency. Denies frequency. Denies hematuria.   Musculoskeletal: Positive for back pain. Denies neck pain. Denies joint swelling.  Neurologic:  Positive change in sensory changes. Denies motor function changes. Denies headache. Denies focal weakness. Denies saddle numbness.   Psychiatric: Denies change in affect. Denies anxiety.     PHYSICAL EXAM:  Vitals: Visit Vitals  /70 (BP Location: LUE - Left upper extremity, Patient Position: Sitting, Cuff Size: Regular)   Pulse 84   Temp 98.5 °F (36.9 °C) (Tympanic)   Resp 16   SpO2 97%     Constitutional: No acute distress. Nontoxic appearance.  Skin: Warm and dry. No rash.  HENT: Normocephalic, atraumatic. Bilateral external ears normal. Oropharynx moist. No oral exudates.   Eyes: Pupils are equal, round, reactive to light and accommodation. Extraocular muscles are intact. Bilateral conjunctivae are normal.    Neck: Normal range of motion. No tenderness. Supple. No lymphadenopathy.  Cardiovascular: Normal heart rate. Regular rhythm. No murmurs.    Pulmonary/Chest: Clear to auscultation bilaterally. No wheezing. No rhonchi. No crackles. No diminished breath  sounds. No cough.  Abdomen: Bowel sounds normal. Soft and nondistended. No tenderness.   Back (sacral region-lateral thigh):  Mild tenderness. Exhibits pain when the patient gets up from chair and begins walking. No costovertebral angle tenderness. Gait with limping. Deep tendon reflexes are 2+ bilaterally. No rash noted. No bruising noted. No swelling noted.  Musculoskeletal: Full range of motion.  Neurologic exam: Alert and oriented x3. No focal deficits.  Psychiatric: Affect normal. Judgment normal. Mood normal.    LABS/RADIOLOGY:  Orders Placed This Encounter    methylPREDNISolone (MEDROL DOSEPAK) 4 MG tablet    cyclobenzaprine (FLEXERIL) 10 MG tablet       ASSESSMENT:  1. Sciatica of left side        PLAN:  Toradol 30 mg IM provided at today's visit and tolerated well. MD suggested patient start Medrol Dosepak and Flexeril as directed.  Use leftover pain medicines sparingly.  Heating pad and ice. Follow-up with PCP in 1 week.  Follow up as scheduled with spine surgeon.  If symptoms persist, change or worsen, be evaluated at the emergency room immediately. Patient agrees with plan. If symptoms worsen, please be evaluated at the emergency room immediately or call # [phone].    All questions were answered during the course of today's visit. There was clear understanding and agreement with treatment plan. It was understood that this is a provisional diagnosis. If there are any new questions at any time about their diagnosis, disease process, progression, or lack of therapeutic response, they are encouraged to call their primary care provider. If no improvement of symptoms or if symptoms worsen, please be evaluated at the EMERGENCY ROOM.    Patient (family representative/guardian) acknowledged understanding of the instructions.       Declined

## 2024-10-11 ENCOUNTER — EMERGENCY (EMERGENCY)
Facility: HOSPITAL | Age: 39
LOS: 0 days | Discharge: HOME | End: 2024-10-11
Attending: EMERGENCY MEDICINE
Payer: MEDICAID

## 2024-10-11 VITALS
HEART RATE: 86 BPM | RESPIRATION RATE: 18 BRPM | SYSTOLIC BLOOD PRESSURE: 106 MMHG | WEIGHT: 139.99 LBS | TEMPERATURE: 98 F | OXYGEN SATURATION: 99 % | DIASTOLIC BLOOD PRESSURE: 65 MMHG

## 2024-10-11 LAB
APPEARANCE UR: CLEAR — SIGNIFICANT CHANGE UP
BILIRUB UR-MCNC: NEGATIVE — SIGNIFICANT CHANGE UP
COLOR SPEC: SIGNIFICANT CHANGE UP
DIFF PNL FLD: NEGATIVE — SIGNIFICANT CHANGE UP
GLUCOSE UR QL: >=1000 MG/DL
KETONES UR-MCNC: ABNORMAL MG/DL
LEUKOCYTE ESTERASE UR-ACNC: NEGATIVE — SIGNIFICANT CHANGE UP
NITRITE UR-MCNC: NEGATIVE — SIGNIFICANT CHANGE UP
PH UR: 6 — SIGNIFICANT CHANGE UP (ref 5–8)
PROT UR-MCNC: SIGNIFICANT CHANGE UP MG/DL
SP GR SPEC: >1.03 — HIGH (ref 1–1.03)
SPECIMEN SOURCE: SIGNIFICANT CHANGE UP
UROBILINOGEN FLD QL: >=8 MG/DL (ref 0.2–1)

## 2024-10-11 PROCEDURE — 99283 EMERGENCY DEPT VISIT LOW MDM: CPT

## 2024-10-11 PROCEDURE — 87491 CHLMYD TRACH DNA AMP PROBE: CPT

## 2024-10-11 PROCEDURE — 87591 N.GONORRHOEAE DNA AMP PROB: CPT

## 2024-10-11 PROCEDURE — 81003 URINALYSIS AUTO W/O SCOPE: CPT

## 2024-10-11 NOTE — ED ADULT NURSE NOTE - NSFALLUNIVINTERV_ED_ALL_ED
Bed/Stretcher in lowest position, wheels locked, appropriate side rails in place/Call bell, personal items and telephone in reach/Instruct patient to call for assistance before getting out of bed/chair/stretcher/Non-slip footwear applied when patient is off stretcher/[place] to call system/Physically safe environment - no spills, clutter or unnecessary equipment/Purposeful proactive rounding/Room/bathroom lighting operational, light cord in reach

## 2024-10-11 NOTE — ED PROVIDER NOTE - NS ED ROS FT
Constitutional: (-) fever  Eyes/ENT: (-) blurry vision, (-) epistaxis  Cardiovascular: (-) chest pain, (-) syncope  Respiratory: (-) cough, (-) shortness of breath  Gastrointestinal: (-) vomiting, (-) diarrhea  : (+) dysuria   Musculoskeletal: (-) neck pain, (-) back pain, (-) joint pain  Integumentary: (-) rash, (-) edema  Neurological: (-) headache, (-) altered mental status  Psychiatric: (-) hallucinations  Allergic/Immunologic: (-) pruritus

## 2024-10-11 NOTE — ED PROVIDER NOTE - PATIENT PORTAL LINK FT
You can access the FollowMyHealth Patient Portal offered by [facility] by registering at the following website: [email or web address]. By joining [employer]’s FollowMyHealth portal, you will also be able to view your health information using other applications (apps) compatible with our system.

## 2024-10-11 NOTE — ED PROVIDER NOTE - PHYSICAL EXAMINATION
Physical Exam    Vital Signs: I have reviewed the initial vital signs.  Constitutional: well-nourished, appears stated age, no acute distress  Eyes: Conjunctiva pink, Sclera clear, PERRLA, EOMI.  Cardiovascular: S1 and S2, regular rate, regular rhythm, well-perfused extremities, radial pulses equal and 2+  Respiratory: unlabored respiratory effort, clear to auscultation bilaterally no wheezing, rales and rhonchi  Gastrointestinal: soft, non-tender abdomen, no pulsatile mass, normal bowl sounds  : normal exam, no penile rashes or testicular rashes, no testicular pain or swelling, no urethral dc.   Musculoskeletal: supple neck, no lower extremity edema, no midline tenderness  Integumentary: warm, dry, no rash  Neurologic: awake, alert, cranial nerves II-XII grossly intact, extremities’ motor and sensory functions grossly intact  Psychiatric: appropriate mood, appropriate affect

## 2024-10-11 NOTE — ED PROVIDER NOTE - OBJECTIVE STATEMENT
Pt is a 39 year old male with PMH noted in chart presents to ED with complaints of burning of penis. Pt states for last 1m has been having burning inside his penis, worse with urination as per pt. Pt states burning is intermittent. Is sexually active with (1) female partner however has not been sexually active in over 2m. Pt has no hx of STD in past. Denies any fever, chills, bodyaches, abdominal pain, flank pain, NVCD

## 2024-10-11 NOTE — ED PROVIDER NOTE - NSFOLLOWUPINSTRUCTIONS_ED_ALL_ED_FT
Follow up with your primary medical doctor as well as with urologist referral placed. Our Emergency Department Referral Coordinators will be reaching out ot you in the next 24-48 hours from 9:00am to 5:00pm (Monday to Friday) with a follow up appointment. Please expect a phone call from the hospital in that time frame. If you do not receive a call or if you have any questions or concerns, you can reach them at [phone] or [phone].

## 2024-10-12 LAB
C TRACH RRNA SPEC QL NAA+PROBE: SIGNIFICANT CHANGE UP
N GONORRHOEA RRNA SPEC QL NAA+PROBE: SIGNIFICANT CHANGE UP

## 2024-10-18 NOTE — CHART NOTE - NSCHARTNOTEFT_GEN_A_CORE
Appt scheduled 11/06/2024 01:30 PM w/ Dr. Mullen @ [address] [facility] (urology referral).

## 2024-10-24 ENCOUNTER — EMERGENCY (EMERGENCY)
Facility: HOSPITAL | Age: 39
LOS: 0 days | Discharge: HOME | End: 2024-10-24
Attending: STUDENT IN AN ORGANIZED HEALTH CARE EDUCATION/TRAINING PROGRAM
Payer: MEDICAID

## 2024-10-24 VITALS
HEART RATE: 92 BPM | TEMPERATURE: 98 F | WEIGHT: 139.99 LBS | RESPIRATION RATE: 18 BRPM | SYSTOLIC BLOOD PRESSURE: 122 MMHG | DIASTOLIC BLOOD PRESSURE: 76 MMHG | OXYGEN SATURATION: 97 %

## 2024-10-24 DIAGNOSIS — E11.9 TYPE 2 DIABETES MELLITUS WITHOUT COMPLICATIONS: ICD-10-CM

## 2024-10-24 DIAGNOSIS — N50.811 RIGHT TESTICULAR PAIN: ICD-10-CM

## 2024-10-24 DIAGNOSIS — N43.3 HYDROCELE, UNSPECIFIED: ICD-10-CM

## 2024-10-24 DIAGNOSIS — N50.812 LEFT TESTICULAR PAIN: ICD-10-CM

## 2024-10-24 PROCEDURE — 99284 EMERGENCY DEPT VISIT MOD MDM: CPT

## 2024-10-24 PROCEDURE — 76870 US EXAM SCROTUM: CPT

## 2024-10-24 PROCEDURE — 76870 US EXAM SCROTUM: CPT | Mod: 26

## 2024-10-24 PROCEDURE — 99284 EMERGENCY DEPT VISIT MOD MDM: CPT | Mod: 25

## 2024-10-24 RX ADMIN — Medication 600 MILLIGRAM(S): at 18:22

## 2024-10-24 NOTE — ED PROVIDER NOTE - OBJECTIVE STATEMENT
40 yo M w/ hx of DM p/w testicular pain > 1 month intermittent, occurring at random described as a "sharp" pain to bilateral testicles. no fevers, n/v/d. patient had ua and std Testing performed during the last ED visit for the same complaints which were all negative.  Patient states he has not been sexually active since then.  Denies dysuria or penile discharge.  Denies lesions.  Denies fevers.  Denies feeling any palpable lumps.

## 2024-10-24 NOTE — ED PROVIDER NOTE - PATIENT PORTAL LINK FT
You can access the FollowMyHealth Patient Portal offered by [facility] by registering at the following website: [email or web address]. By joining [employer]’s FollowMyHealth portal, you will also be able to view your health information using other applications (apps) compatible with our system.

## 2024-10-24 NOTE — ED PROVIDER NOTE - CLINICAL SUMMARY MEDICAL DECISION MAKING FREE TEXT BOX
40 yo M w/ hx of DM p/w testicular pain > 1 month intermittent, occurring at random described as a "sharp" pain to bilateral testicles. no fevers, n/v/d. patient had ua and std Testing performed during the last ED visit for the same complaints which were all negative.  Patient states he has not been sexually active since then.  Denies dysuria or penile discharge.  Denies lesions.  Denies fevers.  Denies feeling any palpable lumps. exam unremarkable. has appointment with urology in 3 weeks. US with no acute pathology- hydrocele noted. stable for dc. return precautions discussed.

## 2024-10-24 NOTE — ED ADULT TRIAGE NOTE - CHIEF COMPLAINT QUOTE
Pt c/o genital pain x 1 month. Pt states he was seen here and referred to urology but the next appt is next month and the pain is too severe

## 2024-10-24 NOTE — ED PROVIDER NOTE - PHYSICAL EXAMINATION
CONSTITUTIONAL: Well-developed; well-nourished; in no acute distress.   SKIN: warm, dry  HEAD: Normocephalic; atraumatic.  EYES: PERRL, EOMI, no conjunctival erythema  ENT: No nasal discharge; airway clear.  ABD: soft ntnd. no cva tenderness.   : chaperoned by RANJAN Ni,  minimal tenderness to bilateral testicles, no lesions. no hernias. normal lay. cremasteric reflex + bilaterally.   EXT: Normal ROM.  No clubbing, cyanosis or edema.   NEURO: Alert, oriented, grossly unremarkable.  PSYCH: Cooperative, appropriate.

## 2024-10-24 NOTE — ED PROVIDER NOTE - NSFOLLOWUPINSTRUCTIONS_ED_ALL_ED_FT
Our Emergency Department Referral Coordinators will be reaching out to you in the next 24-48 hours from 9:00am to 5:00pm to schedule a follow up appointment. Please expect a phone call from the hospital in that time frame. If you do not receive a call or if you have any questions or concerns, you can reach them at   [phone].      Scrotal Pain    WHAT YOU NEED TO KNOW:    What do I need to know about scrotal pain? Scrotal pain can happen at any age. The cause of scrotal pain can range from a minor injury to a serious medical condition. It is very important to seek immediate care if you have scrotal pain. The pain may be a warning sign of a serious condition that will need treatment. Without immediate care, you may be at increased risk for losing a testicle or being sterile (not having children).    What may cause scrotal pain?    Torsion (twisting) of the testicle, cord that carries sperm from the testicle, or tissue attached to the testicle    An infection of the testicle or other area in the scrotum    A hydrocele (fluid buildup around the testicle) or varicocele (blood backup in veins in the scrotum)    An inguinal hernia (tissue pushed out of place in your groin)    Vinny gangrene (tissue death of the area between the scrotum and anus)    A urinary tract infection or stone that is passing, or an infected appendix    An injury in your groin or scrotum  What are the warning signs of a serious medical problem? Seek care immediately if you have any of the following:    Pain that starts suddenly or is severe    Swelling in your scrotum or groin, especially if you also have severe pain or are vomiting    Red or black patches of skin on your scrotum or area between your penis and anus    Blisters anywhere in your groin or scrotum    A fever  How is the cause of scrotal pain diagnosed? Your healthcare provider will examine you and ask about your pain. Tell the provider when the pain started and how long it lasts. Your provider will ask if pain started in another area and moved to your scrotum. The pain may also have moved from your scrotum to another area. Tell your provider if you have pain during exercise or if you had an injury to your groin. Also tell your provider if you have any problems urinating or if any discharge came out of your penis. Your provider may also ask about your sexual activity.    Blood tests may be used to check for signs of infection.    Ultrasound pictures may show a problem with your testicles or tissues in your scrotum. An ultrasound may also show kidney stones or other problems that may be causing your pain.  How is scrotal pain treated? Treatment will depend on the cause of your pain:    Prescription pain medicine may be given. Ask your healthcare provider how to take this medicine safely. Some prescription pain medicines contain acetaminophen. Do not take other medicines that contain acetaminophen without talking to your healthcare provider. Too much acetaminophen may cause liver damage. Prescription pain medicine may cause constipation. Ask your healthcare provider how to prevent or treat constipation.    NSAIDs, such as ibuprofen, help decrease swelling, pain, and fever. This medicine is available with or without a doctor's order. NSAIDs can cause stomach bleeding or kidney problems in certain people. If you take blood thinner medicine, always ask your healthcare provider if NSAIDs are safe for you. Always read the medicine label and follow directions.    Antibiotics are used to treat a bacterial infection.    Surgery may be needed to untwist the testicle, or cord, or to remove dead or infected tissue.  What can I do to manage my symptoms?    Wear a support device, if directed. A support device, such as a jock strap, can help keep your scrotum lifted and supported. This can help decrease pain.    Apply ice to your scrotum. Ice helps decrease pain and swelling. Use an ice pack, or put crushed ice in a plastic bag. Cover the pack or bag with a towel before you apply it to your scrotum. Apply ice for 15 to 20 minutes every hour, or as directed.  When should I seek immediate care?    You have any warning signs of a serious problem.    You have pain or swelling that starts or gets worse quickly.    You have skin changes in your scrotum, such as a dark patch.    You have a fever.  When should I contact my healthcare provider?    Your pain does not get better, even after you take pain medicine.    You have new or worsening pain.    You have questions or concerns about your condition or care.  CARE AGREEMENT:    You have the right to help plan your care. Learn about your health condition and how it may be treated. Discuss treatment options with your healthcare providers to decide what care you want to receive. You always have the right to refuse treatment.

## 2024-10-24 NOTE — ED ADULT NURSE NOTE - NSFALLUNIVINTERV_ED_ALL_ED
Bed/Stretcher in lowest position, wheels locked, appropriate side rails in place/Call bell, personal items and telephone in reach/Instruct patient to call for assistance before getting out of bed/chair/stretcher/Non-slip footwear applied when patient is off stretcher/[place] to call system/Physically safe environment - no spills, clutter or unnecessary equipment/Purposeful proactive rounding/Room/bathroom lighting operational, light cord in reach

## 2024-11-06 ENCOUNTER — APPOINTMENT (OUTPATIENT)
Dept: UROLOGY | Facility: CLINIC | Age: 39
End: 2024-11-06
Payer: MEDICAID

## 2024-11-06 DIAGNOSIS — R82.90 UNSPECIFIED ABNORMAL FINDINGS IN URINE: ICD-10-CM

## 2024-11-06 DIAGNOSIS — N52.9 MALE ERECTILE DYSFUNCTION, UNSPECIFIED: ICD-10-CM

## 2024-11-06 DIAGNOSIS — E11.9 TYPE 2 DIABETES MELLITUS W/OUT COMPLICATIONS: ICD-10-CM

## 2024-11-06 DIAGNOSIS — N48.89 OTHER SPECIFIED DISORDERS OF PENIS: ICD-10-CM

## 2024-11-06 DIAGNOSIS — B35.6 TINEA CRURIS: ICD-10-CM

## 2024-11-06 DIAGNOSIS — N43.3 HYDROCELE, UNSPECIFIED: ICD-10-CM

## 2024-11-06 DIAGNOSIS — N50.3 CYST OF EPIDIDYMIS: ICD-10-CM

## 2024-11-06 DIAGNOSIS — N53.14 RETROGRADE EJACULATION: ICD-10-CM

## 2024-11-06 LAB
BILIRUB UR QL STRIP: NORMAL
COLLECTION METHOD: NORMAL
GLUCOSE UR-MCNC: 500
HCG UR QL: 0.2 EU/DL
HGB UR QL STRIP.AUTO: NORMAL
KETONES UR-MCNC: NORMAL
LEUKOCYTE ESTERASE UR QL STRIP: NORMAL
NITRITE UR QL STRIP: NORMAL
PH UR STRIP: 6
PROT UR STRIP-MCNC: NORMAL
SP GR UR STRIP: 1

## 2024-11-06 PROCEDURE — G2211 COMPLEX E/M VISIT ADD ON: CPT | Mod: NC

## 2024-11-06 PROCEDURE — 99204 OFFICE O/P NEW MOD 45 MIN: CPT

## 2024-11-06 PROCEDURE — 81003 URINALYSIS AUTO W/O SCOPE: CPT | Mod: QW

## 2024-11-06 RX ORDER — NAPROXEN 500 MG/1
500 TABLET ORAL
Qty: 60 | Refills: 0 | Status: ACTIVE | COMMUNITY
Start: 2024-11-06 | End: 1900-01-01

## 2024-11-06 RX ORDER — KETOCONAZOLE 20 MG/G
2 CREAM TOPICAL TWICE DAILY
Qty: 50 | Refills: 0 | Status: ACTIVE | COMMUNITY
Start: 2024-11-06 | End: 1900-01-01

## 2024-11-06 RX ORDER — GLIPIZIDE 5 MG/1
5 TABLET ORAL
Refills: 0 | Status: ACTIVE | COMMUNITY

## 2024-11-06 RX ORDER — ROSUVASTATIN CALCIUM 5 MG/1
5 TABLET, FILM COATED ORAL
Refills: 0 | Status: ACTIVE | COMMUNITY

## 2024-11-06 RX ORDER — LOSARTAN POTASSIUM 25 MG/1
25 TABLET, FILM COATED ORAL
Refills: 0 | Status: ACTIVE | COMMUNITY

## 2024-11-06 RX ORDER — METFORMIN HYDROCHLORIDE 850 MG/1
850 TABLET, COATED ORAL
Refills: 0 | Status: ACTIVE | COMMUNITY

## 2024-11-07 PROBLEM — N53.14 RETROGRADE EJACULATION: Status: ACTIVE | Noted: 2024-11-07

## 2024-11-07 PROBLEM — N43.3 BILATERAL HYDROCELE: Status: ACTIVE | Noted: 2024-11-07

## 2024-11-07 PROBLEM — B35.6 JOCK ITCH: Status: ACTIVE | Noted: 2024-11-07

## 2024-11-07 PROBLEM — N50.3 EPIDIDYMAL CYST: Status: ACTIVE | Noted: 2024-11-07

## 2024-12-04 ENCOUNTER — RX RENEWAL (OUTPATIENT)
Age: 39
End: 2024-12-04

## 2024-12-19 ENCOUNTER — APPOINTMENT (OUTPATIENT)
Dept: UROLOGY | Facility: CLINIC | Age: 39
End: 2024-12-19
Payer: MEDICAID

## 2024-12-19 ENCOUNTER — NON-APPOINTMENT (OUTPATIENT)
Age: 39
End: 2024-12-19

## 2024-12-19 DIAGNOSIS — N52.9 MALE ERECTILE DYSFUNCTION, UNSPECIFIED: ICD-10-CM

## 2024-12-19 DIAGNOSIS — N46.9 MALE INFERTILITY, UNSPECIFIED: ICD-10-CM

## 2024-12-19 DIAGNOSIS — N53.14 RETROGRADE EJACULATION: ICD-10-CM

## 2024-12-19 LAB
BILIRUB UR QL STRIP: NORMAL
COLLECTION METHOD: NORMAL
GLUCOSE UR-MCNC: NORMAL
HCG UR QL: NORMAL EU/DL
HGB UR QL STRIP.AUTO: NORMAL
KETONES UR-MCNC: NORMAL
LEUKOCYTE ESTERASE UR QL STRIP: NORMAL
NITRITE UR QL STRIP: NORMAL
PH UR STRIP: 6
PROT UR STRIP-MCNC: NORMAL
SP GR UR STRIP: 1.01

## 2024-12-19 PROCEDURE — G2211 COMPLEX E/M VISIT ADD ON: CPT | Mod: NC

## 2024-12-19 PROCEDURE — 99214 OFFICE O/P EST MOD 30 MIN: CPT

## 2024-12-19 PROCEDURE — 81003 URINALYSIS AUTO W/O SCOPE: CPT | Mod: QW

## 2024-12-19 RX ORDER — TADALAFIL 5 MG/1
5 TABLET ORAL
Qty: 30 | Refills: 6 | Status: ACTIVE | COMMUNITY
Start: 2024-12-19 | End: 1900-01-01

## 2024-12-19 NOTE — ED ADULT TRIAGE NOTE - MEANS OF ARRIVAL
PT refuses green gown and mental health process. Pt states \"I promise you I will not do anything until I get my phone\". Pt advised security will be notified if pt does not get into green gown.    ambulatory

## 2025-02-06 ENCOUNTER — APPOINTMENT (OUTPATIENT)
Dept: UROLOGY | Facility: CLINIC | Age: 40
End: 2025-02-06

## 2025-05-15 NOTE — ED PROVIDER NOTE - NS_EDPROVIDERDISPOUSERTYPE_ED_A_ED
Contacted patient off of [facility]  wait list to verify needs of services in attempts to update list with patient preferences. LVM for patient to contact intake dept  in regards to wait list maintenance.     2nd attempt, pt removed from [facility]    Upon call back, verify needs of services, information, and preferences. If interested, add pt back to [facility] with original start date.    I have personally evaluated and examined the patient. The Attending was available to me as a supervising provider if needed.

## 2025-08-06 NOTE — ED ADULT NURSE NOTE - CAS DISCH TRANSFER METHOD
Pt is tolerating PO snack well.  Reviewed discharge instructions, educated pt on anesthesia safety.   Pt wearing glasses-All pre-op belongings with the pt.   Private car